# Patient Record
Sex: FEMALE | Race: BLACK OR AFRICAN AMERICAN | URBAN - METROPOLITAN AREA
[De-identification: names, ages, dates, MRNs, and addresses within clinical notes are randomized per-mention and may not be internally consistent; named-entity substitution may affect disease eponyms.]

---

## 2023-02-09 ENCOUNTER — OFFICE VISIT (OUTPATIENT)
Dept: URBAN - METROPOLITAN AREA CLINIC 107 | Facility: CLINIC | Age: 28
End: 2023-02-09
Payer: COMMERCIAL

## 2023-02-09 ENCOUNTER — TELEPHONE ENCOUNTER (OUTPATIENT)
Dept: URBAN - METROPOLITAN AREA CLINIC 113 | Facility: CLINIC | Age: 28
End: 2023-02-09

## 2023-02-09 ENCOUNTER — WEB ENCOUNTER (OUTPATIENT)
Dept: URBAN - METROPOLITAN AREA CLINIC 107 | Facility: CLINIC | Age: 28
End: 2023-02-09

## 2023-02-09 ENCOUNTER — DASHBOARD ENCOUNTERS (OUTPATIENT)
Age: 28
End: 2023-02-09

## 2023-02-09 VITALS
BODY MASS INDEX: 27.64 KG/M2 | DIASTOLIC BLOOD PRESSURE: 65 MMHG | HEIGHT: 66 IN | TEMPERATURE: 98.2 F | HEART RATE: 77 BPM | WEIGHT: 172 LBS | SYSTOLIC BLOOD PRESSURE: 106 MMHG

## 2023-02-09 DIAGNOSIS — R10.84 GENERALIZED ABDOMINAL PAIN: ICD-10-CM

## 2023-02-09 PROCEDURE — 99204 OFFICE O/P NEW MOD 45 MIN: CPT

## 2023-02-09 RX ORDER — DICYCLOMINE HYDROCHLORIDE 10 MG/1
1 CAPSULE CAPSULE ORAL
Qty: 90 | Refills: 1 | OUTPATIENT
Start: 2023-02-09 | End: 2023-04-10

## 2023-02-09 NOTE — HPI-TODAY'S VISIT:
27 year old female with a history of uterine fibroids presents for evaluation of acute abdominal pain. We do not have records for review.  She reports generalized abdominal cramping. This has been constant for a few months. She denies diarrhea or rectal bleeding. Bowel movements are regular and without blood. Denies sarah loss, nausea or vomiting. No dysphagia or heartburn. No family history of IBd or colon cancer. She has not tried anything for the pain. She had a uterine myomectomy in May of last year. She had a intravaginal US last week which was normal. She is sexually active. She did skip one period after her surgery. Denies any new medications or changes in diet.

## 2023-02-23 ENCOUNTER — ERX REFILL RESPONSE (OUTPATIENT)
Dept: URBAN - METROPOLITAN AREA CLINIC 107 | Facility: CLINIC | Age: 28
End: 2023-02-23

## 2023-02-23 RX ORDER — DICYCLOMINE HYDROCHLORIDE 10 MG/1
TAKE 1 CAPSULE ORALLY THREE TIMES A DAY AS NEEDED 30 DAY(S) CAPSULE ORAL
Qty: 270 CAPSULE | Refills: 1 | OUTPATIENT

## 2023-02-23 RX ORDER — DICYCLOMINE HYDROCHLORIDE 10 MG/1
1 CAPSULE CAPSULE ORAL
Qty: 90 | Refills: 1 | OUTPATIENT